# Patient Record
Sex: MALE | Race: WHITE
[De-identification: names, ages, dates, MRNs, and addresses within clinical notes are randomized per-mention and may not be internally consistent; named-entity substitution may affect disease eponyms.]

---

## 2017-03-25 ENCOUNTER — HOSPITAL ENCOUNTER (EMERGENCY)
Dept: HOSPITAL 10 - FTE | Age: 47
Discharge: HOME | End: 2017-03-25
Payer: COMMERCIAL

## 2017-03-25 VITALS
SYSTOLIC BLOOD PRESSURE: 133 MMHG | HEART RATE: 76 BPM | TEMPERATURE: 98.4 F | DIASTOLIC BLOOD PRESSURE: 64 MMHG | RESPIRATION RATE: 19 BRPM

## 2017-03-25 VITALS
BODY MASS INDEX: 33.54 KG/M2 | HEIGHT: 64 IN | HEIGHT: 64 IN | WEIGHT: 196.43 LBS | WEIGHT: 196.43 LBS | BODY MASS INDEX: 33.54 KG/M2

## 2017-03-25 DIAGNOSIS — J06.9: Primary | ICD-10-CM

## 2017-03-25 PROCEDURE — 99284 EMERGENCY DEPT VISIT MOD MDM: CPT

## 2017-03-25 NOTE — ERD
ER Documentation


Chief Complaint


Date/Time


DATE: 3/25/17 


TIME: 08:54


Chief Complaint


st





[facility]


Patient is a 46 year old male with no significant past medical history who 

presents to the ED  with cough, sore throat, runny nose and tactile fevers 2 

days.  States that he has a productive cough, nonbloody.  Denies night sweats.  

He states he has not taken any medication for his symptoms.  He denies 

shortness of breath, difficulty breathing.  He denies chest pain.  Denies 

headache or dizziness, neck pain or stiffness.  Denies leg pain or swelling.  

Denies recent travel, recent surgeries.  Denies abdominal pain, nausea, vomiting

, diarrhea to patient.  He states that other people at home have had similar 

symptoms.  No other complaints.





ROS


All systems reviewed and are negative except as per history of present illness.





Medications


Home Meds


Active Scripts


Cetirizine Hcl* (Zyrtec*) 10 Mg Capsule, 10 MG PO DAILY, #20 TAB.CHEW


   Prov:JOSEPH AGUILARC         3/25/17


Azithromycin* (Zithromax*) 250 Mg Tablet, 250 MG PO .ZPACK AS DIRECTED, #6 TAB


   TAKE 500 MG (2 TABS) THE FIRST DAY THEN 250 MG (1 TAB) DAYS 2-5


   Prov:JOSEPH AGUILAR-C         3/25/17


Benzonatate* (Tessalon Perle*) 100 Mg Capsule, 100 MG PO Q8H Y for COUGH for 10 

Days, CAP


   Prov:JOSEPH AGUILAR-C         3/25/17


Acetaminophen* (Tylophen*) 500 Mg Capsule, 1 CAP PO Q6H Y for PAIN AND OR 

ELEVATED TEMP, #20 CAP


   Prov:JOSEPH AGUILAR-C         3/25/17


Docusate Sodium* (Colace*) 100 Mg Capsule, 100 MG PO TID for CONSTIPATION for 7 

Days, #30 CAP


   Prov:RASHEED MILLERC         7/26/16


Ibuprofen* (Motrin*) 600 Mg Tab, 600 MG PO Q6, #20 TAB


   Prov:RASHEED MILLERC         7/26/16


Hydrocodone Bit-Acetaminophen* (Norco*)  Mg Tablet, 1 TAB PO Q6 Y for PAIN

, #10 TAB


   Prov:RASHEED MILLER PA-C         7/26/16


Amoxicillin-Clavulanate K* (Augmentin*) 500 Mg Tab, 875 MG PO BID for 10 Days, 

TAB


   Prov:RASHEED MILLER PA-C         7/26/16


Docusate Sodium* (Colace*) 100 Mg Capsule, 100 MG PO TID Y for CONSTIPATION, #

30 CAP


   Prov:OBINNA GILMORE NP         5/19/16


Hydrocodone Bit-Acetaminophen* (Norco*) 5-325 Mg Tab, 1 TAB PO Q6 Y for PAIN, #

7 TAB


   Prov:MARTHA PATEL PA-C         5/14/16


Sulfamethoxazole-Trimethoprim* (Bactrim* DS) 800-160 Mg Tab, 1 TAB PO BID for 7 

Days, TAB


   Prov:MARTHA PATEL PA-C         5/14/16


Cephalexin* (Keflex*) 500 Mg Capsule, 500 MG PO QID for 7 Days, CAP


   Prov:MARTHA PATEL PA-C         5/14/16


Reported Medications


Omeprazole* (Prilosec*) 40 Mg Capsule.dr, 40 MG PO DAILY


   3/13/12


[prostate meds]   No Conflict Check


   3/13/12





Allergies


Allergies:  


Coded Allergies:  


     No Known Drug Allergies (Verified  Allergy, Mild, 3/25/17)





PMhx/Soc


Medical and Surgical Hx:  pt denies Medical Hx


History of Surgery:  Yes (Abd surgery at age 5)


Anesthesia Reaction:  No


Hx Neurological Disorder:  No


Hx Respiratory Disorders:  No


Hx Cardiac Disorders:  No


Hx Psychiatric Problems:  No


Hx Miscellaneous Medical Probl:  No


Hx Alcohol Use:  No


Hx Substance Use:  No


Hx Tobacco Use:  No


Smoking Status:  Never smoker





FmHx


Family History:  No coronary disease, No diabetes, No other





Physical Exam


Vitals





Vital Signs








  Date Time  Temp Pulse Resp B/P Pulse Ox O2 Delivery O2 Flow Rate FiO2


 


3/25/17 08:30 98.9 99 18 138/84 99   








Physical Exam





GENERAL: Well-developed, well-nourished male. Appears in no acute distress. 


HEAD: Normocephalic, atraumatic. 


EYES: Pupils are equally reactive bilaterally. EOMs grossly intact. No 

conjunctival erythema.  Bilateral TMs are nonerythematous, nonbulging no 

drainage no mastoid tenderness


ENT: Moist mucous membranes. No uvula deviation. No kissing tonsils. No 

exudates.  Slightly erythematous.


NECK: Supple. No lymphadenopathy or thyromegaly. No meningismus. negative 

kernig. negative brudinski.  


LUNG: Clear to auscultation bilaterally. No rhonchi, wheezing, rales or coarse 

breath sounds. 


HEART: Regular rate and rhythm. No murmurs, rubs or gallops.


Extremities: Equal pulses bilaterally. No peripheral clubbing, cyanosis or 

edema. No unilateral leg swelling.


NEUROLOGIC: Alert and oriented. Moving all four extremities. 5/5 strength in 

all extremities. Normal speech. Steady gait. 


SKIN: Normal color. Warm and dry. No rashes or lesions. Capillary refill < 2 

seconds





Procedures/MDM


ER COURSE:


I kept the patient and/or family informed of laboratory and diagnostic imaging 

results throughout the emergency room course.





MEDICAL DECISION MAKING:


This is a 46-year-old male who presents with cough, runny nose, sore throat 2 

days. Vital signs were reviewed. Patient is afebrile. Patient is not hypoxic.  

Patient is not toxic or ill-appearing.  Temperature 98.9 with an O2 sat of 99.  

Patient likely has URI of viral etiology.  Low suspicion for pneumonia, PE, 

pneumothorax, ACS, epiglottitis, obstruction, TB, pertussis, meningitis, 

sepsis.  I do not think a chest x-ray is warranted at this time and his lung 

examination is within normal limits and he is afebrile. 








DISCHARGE:


At this time, patient is stable for discharge and outpatient management with no 

new complaints during the ER course. Patient was sent home with Tylenol, 

Tessalon Perles, Zyrtec and azithromycin.  I advised patient to start 

azithromycin in 2 days if symptoms continue to worsen.. Patient will be 

discharged home with instructions to recheck for new or worsening symptoms such 

as fever, nausea, weakness, LOC and to follow up with primary care in the next 1

-2 days. Patient was advised to return to the ER for any new or worsening 

symptoms. Plan was discussed and patient and/or family understands and agrees. 

Home instructions were given.





Departure


Diagnosis:  


 Primary Impression:  


 URI, acute


Condition:  Stable


Patient Instructions:  Uri, Viral, No Abx (Adult)





Additional Instructions:  


Llame al doctor MAANA y kofi stephanie CHRISTAL PARA DENTRO DE 1-2 ELIZALDE.Dgale a la 

secretaria que nosotros le instruimos hacer esta christal.Avise o llame si merritt 

condicin se empeora antes de la christal. Regresa aqui si peor o no mejor.











JOSEPH AGUILAR PA-C Mar 25, 2017 09:00

## 2018-05-09 ENCOUNTER — HOSPITAL ENCOUNTER (EMERGENCY)
Age: 48
Discharge: HOME | End: 2018-05-09

## 2018-05-09 ENCOUNTER — HOSPITAL ENCOUNTER (EMERGENCY)
Dept: HOSPITAL 91 - FTE | Age: 48
Discharge: HOME | End: 2018-05-09
Payer: COMMERCIAL

## 2018-05-09 DIAGNOSIS — R05: Primary | ICD-10-CM

## 2018-05-09 PROCEDURE — 99284 EMERGENCY DEPT VISIT MOD MDM: CPT

## 2018-05-12 ENCOUNTER — HOSPITAL ENCOUNTER (EMERGENCY)
Age: 48
LOS: 1 days | Discharge: LEFT BEFORE DISCHARGE | End: 2018-05-13

## 2018-05-12 ENCOUNTER — HOSPITAL ENCOUNTER (EMERGENCY)
Dept: HOSPITAL 91 - E/R | Age: 48
LOS: 1 days | Discharge: LEFT BEFORE DISCHARGE | End: 2018-05-13
Payer: COMMERCIAL

## 2018-05-12 DIAGNOSIS — Z53.21: Primary | ICD-10-CM
